# Patient Record
Sex: MALE | Race: WHITE | HISPANIC OR LATINO | URBAN - NONMETROPOLITAN AREA
[De-identification: names, ages, dates, MRNs, and addresses within clinical notes are randomized per-mention and may not be internally consistent; named-entity substitution may affect disease eponyms.]

---
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
## 2022-06-27 ENCOUNTER — OFFICE VISIT (OUTPATIENT)
Dept: URBAN - NONMETROPOLITAN AREA CLINIC 6 | Facility: CLINIC | Age: 22
End: 2022-06-27
Payer: COMMERCIAL

## 2022-06-27 DIAGNOSIS — H52.13 MYOPIA, BILATERAL: Primary | ICD-10-CM

## 2022-06-27 PROCEDURE — 92310 CONTACT LENS FITTING OU: CPT | Performed by: STUDENT IN AN ORGANIZED HEALTH CARE EDUCATION/TRAINING PROGRAM

## 2022-06-27 PROCEDURE — 92004 COMPRE OPH EXAM NEW PT 1/>: CPT | Performed by: STUDENT IN AN ORGANIZED HEALTH CARE EDUCATION/TRAINING PROGRAM

## 2022-06-27 ASSESSMENT — INTRAOCULAR PRESSURE
OD: 10
OS: 10

## 2022-06-27 ASSESSMENT — VISUAL ACUITY
OS: 20/20
OD: 20/20

## 2022-06-27 NOTE — IMPRESSION/PLAN
Impression: Myopia, bilateral: H52.13. Plan: Explained in detail, diagnosis with patient. New glasses prescription given today. Expires 1 year. Updated CL prescription given, expires 1 year. Patient educated on good CL hygiene and compliance. No swimming, showering, or sleeping in lenses. Replace lenses [id number]. If contact lenses cause irritation or redness, remove and RTC immediately.